# Patient Record
Sex: FEMALE | Race: BLACK OR AFRICAN AMERICAN | NOT HISPANIC OR LATINO | URBAN - METROPOLITAN AREA
[De-identification: names, ages, dates, MRNs, and addresses within clinical notes are randomized per-mention and may not be internally consistent; named-entity substitution may affect disease eponyms.]

---

## 2021-05-09 ENCOUNTER — EMERGENCY (EMERGENCY)
Facility: HOSPITAL | Age: 81
LOS: 1 days | Discharge: HOME | End: 2021-05-09
Attending: EMERGENCY MEDICINE | Admitting: EMERGENCY MEDICINE
Payer: MEDICARE

## 2021-05-09 VITALS
SYSTOLIC BLOOD PRESSURE: 140 MMHG | RESPIRATION RATE: 18 BRPM | OXYGEN SATURATION: 96 % | TEMPERATURE: 98 F | DIASTOLIC BLOOD PRESSURE: 75 MMHG | HEART RATE: 70 BPM

## 2021-05-09 VITALS
HEART RATE: 71 BPM | SYSTOLIC BLOOD PRESSURE: 145 MMHG | TEMPERATURE: 99 F | OXYGEN SATURATION: 96 % | DIASTOLIC BLOOD PRESSURE: 73 MMHG | RESPIRATION RATE: 17 BRPM | WEIGHT: 138.89 LBS | HEIGHT: 59 IN

## 2021-05-09 DIAGNOSIS — Z87.891 PERSONAL HISTORY OF NICOTINE DEPENDENCE: ICD-10-CM

## 2021-05-09 DIAGNOSIS — R42 DIZZINESS AND GIDDINESS: ICD-10-CM

## 2021-05-09 DIAGNOSIS — R55 SYNCOPE AND COLLAPSE: ICD-10-CM

## 2021-05-09 DIAGNOSIS — E03.9 HYPOTHYROIDISM, UNSPECIFIED: ICD-10-CM

## 2021-05-09 DIAGNOSIS — Z20.822 CONTACT WITH AND (SUSPECTED) EXPOSURE TO COVID-19: ICD-10-CM

## 2021-05-09 PROCEDURE — 36415 COLL VENOUS BLD VENIPUNCTURE: CPT

## 2021-05-09 PROCEDURE — 71045 X-RAY EXAM CHEST 1 VIEW: CPT

## 2021-05-09 PROCEDURE — U0005: CPT

## 2021-05-09 PROCEDURE — 84484 ASSAY OF TROPONIN QUANT: CPT

## 2021-05-09 PROCEDURE — 99283 EMERGENCY DEPT VISIT LOW MDM: CPT | Mod: 25

## 2021-05-09 PROCEDURE — 93005 ELECTROCARDIOGRAM TRACING: CPT

## 2021-05-09 PROCEDURE — 85025 COMPLETE CBC W/AUTO DIFF WBC: CPT

## 2021-05-09 PROCEDURE — 83735 ASSAY OF MAGNESIUM: CPT

## 2021-05-09 PROCEDURE — 81003 URINALYSIS AUTO W/O SCOPE: CPT

## 2021-05-09 PROCEDURE — 85610 PROTHROMBIN TIME: CPT

## 2021-05-09 PROCEDURE — U0003: CPT

## 2021-05-09 PROCEDURE — 80053 COMPREHEN METABOLIC PANEL: CPT

## 2021-05-09 PROCEDURE — 71045 X-RAY EXAM CHEST 1 VIEW: CPT | Mod: 26

## 2021-05-09 PROCEDURE — 85730 THROMBOPLASTIN TIME PARTIAL: CPT

## 2021-05-09 PROCEDURE — 99285 EMERGENCY DEPT VISIT HI MDM: CPT

## 2021-05-09 PROCEDURE — 87086 URINE CULTURE/COLONY COUNT: CPT

## 2021-05-09 NOTE — ED PROVIDER NOTE - PATIENT PORTAL LINK FT
You can access the FollowMyHealth Patient Portal offered by [facility] by registering at the following website: [email or web address]. By joining [employer]’s FollowMyHealth portal, you will also be able to view your health information using other applications (apps) compatible with our system.

## 2021-05-09 NOTE — ED PROVIDER NOTE - OBJECTIVE STATEMENT
80F former smoker (5py), htn, hypothyroidism, completed covid19 vaccination (2/2021), c/o witnessed near-syncope episode just pta. per pt/niece, pt had just eaten a large meal and got up quickly w/ a strong urge to defecate w/ subsequent ligtheadedness/flushing feeling herself sink to floor while being guided down by niece. no loc, no head/neck injury, no n/v, no postical, no seizure like activity. at baseline just prior. no sx complaint currently, and feels back to baseline. no fever/chills, no ha, no neck pain/stiffness, no photophobia/vision changes, no uri/cough, no cp/sob, no abd pain/n/v, no diarrhea, no hematochezia/melena, no change in appetite/po intake, no dysuria, no rash, no recent travel, no sick contacts, no prior covid, no trauma, no etoh-dpt/ivdu.     highly functional at baseline, a+ox3, +ADLs, ambulates unassisted, lives at home. 80F former smoker (5py), htn, hypothyroidism, completed covid19 vaccination (2/2021), c/o witnessed near-syncope episode just pta. per pt/niece, pt had just eaten a large meal and got up quickly w/ a strong urge to defecate w/ subsequent ligtheadedness/flushing feeling herself sink to floor while being guided down by niece. no loc, no head/neck injury, no n/v, no postical, no seizure like activity. at baseline just prior. no sx complaint currently, and feels back to baseline. no fever/chills, no ha, no neck pain/stiffness, no photophobia/vision changes, no uri/cough, no cp/sob, no abd pain/n/v, no diarrhea, no hematochezia/melena, no change in appetite/po intake, no dysuria, no rash, no recent travel, no sick contacts, no prior covid, no trauma, no etoh-dpt/ivdu.     highly functional at baseline, a+ox3, +ADLs, ambulates w/ cane, lives at home. 80F former smoker (5py), htn, hypothyroidism, completed covid19 vaccination (2/2021), c/o witnessed near-syncope episode just pta. per pt/niece, pt had just eaten a large meal and got up quickly w/ a strong urge to defecate w/ subsequent ligtheadedness/flushing feeling herself sink to floor while being guided down by niece. no loc, no head/neck injury, no n/v, no postictal, no seizure like activity. at baseline just prior. no sx complaint currently, and feels back to baseline. no fever/chills, no ha, no neck pain/stiffness, no photophobia/vision changes, no uri/cough, no cp/sob, no abd pain/n/v, no diarrhea, no hematochezia/melena, no change in appetite/po intake, no dysuria, no rash, no recent travel, no sick contacts, no prior covid, no trauma, no etoh-dpt/ivdu.     highly functional at baseline, a+ox3, +ADLs, ambulates w/ cane, lives at home.

## 2021-05-09 NOTE — ED PROVIDER NOTE - CLINICAL SUMMARY MEDICAL DECISION MAKING FREE TEXT BOX
avss. nontoxic. NAD. no systemic sx. atraumatic. neg orthostatics. no acute focal neuro deficits. no red flags. no leukocytosis vs significant anemia vs electrolyte abnl. trop wnl x2. ekg w/o significant st/t changes. cxr w/o acute focal consol vs ptx vs pulm edema vs widened mediastinum. no recurrent sx. tolerating po. ambulating at baseline to bathroom. no indication for ct imaging or inpatient hospitalization at this time. will dc w/ outpatient pcp fu, strict return precautions. pt/family agrees w/ plan. questions answered.

## 2021-05-09 NOTE — ED PROVIDER NOTE - PHYSICAL EXAMINATION
CONST: nontoxic NAD speaking in full sentences  HEAD: atraumatic  EYES: conjunctivae clear, PERRL, EOMI  ENT: mmm  NECK: supple/FROM, nttp, no jvd  CARD: rrr no murmurs  CHEST: ctab no r/r/w, no stridor/retractions/tripoding  ABD: soft, nd, nttp, no rebound/guarding  EXT: FROM, symmetric distal pulses intact  SKIN: warm, dry, no rash, no pedal edema/ttp/rash, cap refill <2sec  NEURO: a+ox3, 5/5 strength x4, gross sensation intact x4, baseline gait

## 2021-05-09 NOTE — ED ADULT NURSE NOTE - OBJECTIVE STATEMENT
79 y/o female received into the ED, axox3, stating that she "passed out for a few seconds on my way to the bathroom."  Pt. states that she had finished dinner when she had a sudden urge to go to the bathroom and felt very light headed when she began to walk to the bathroom.  Adult niece at the bedside states that patient became pale suddenly and fell into her the arms of her family members.  Niece reports that patient seemed to loose consciousness for about 2-3 seconds.  Pt. states that at this time she does not feel any dizziness.

## 2021-05-09 NOTE — ED PROVIDER NOTE - NSFOLLOWUPINSTRUCTIONS_ED_ALL_ED_FT
PLEASE FOLLOW-UP WITH YOUR PCP IN 1-2 DAYS.    ANY XRAY IMAGING RESULTS GIVEN IN THE EMERGENCY DEPARTMENT ARE PRELIMINARY UNTIL FORMALLY READ BY A RADIOLOGIST. YOU WILL BE CONTACTED IF THERE ARE ANY SIGNIFICANT CHANGES IN THE FINAL READ.    PLEASE RETURN TO THE EMERGENCY DEPARTMENT IF SEVERE HEADACHE, RECURRENT/PERSISTENT DIZZINESS, FAINTING, FEVER, CHEST PAIN, SHORTNESS OF BREATH, ABDOMINAL PAIN, VOMITING, OTHER CONCERNING SYMPTOMS.                          LIGHTHEADEDNESS - Ambulatory Care           Lightheadedness    AMBULATORY CARE:    Lightheadedness is the feeling that you may faint, but you do not. Your heartbeat may be fast or feel like it flutters. Lightheadedness may occur when you take certain medicines, such as medicine to lower your blood pressure. Dehydration, low sodium, low blood sugar, an abnormal heart rhythm, and anxiety are other common causes.     Seek care immediately if:   •You have sudden chest pain.       •You have trouble breathing or shortness of breath.       •You have vision changes, are sweating, and have nausea while you are sitting or lying down.       •You feel flushed and your heart is fluttering.      •You pass out.       Contact your healthcare provider if:   •You feel lightheaded often.       •Your heart beats faster or slower than usual.       •You have questions or concerns about your condition or care.      Manage your symptoms: Talk with your healthcare provider about these and other ways to manage your symptoms:  •Lie down when you feel lightheaded, your throat gets tight, or your vision changes. Raise your legs above the level of your heart.      •Stand up slowly. Sit on the side of the bed or couch for a few minutes before you stand up.       •Take slow, deep breaths when you feel lightheaded. This can help decrease the feeling that you might faint.       •Ask if you need to avoid hot baths and saunas. These may make your symptoms worse.       Watch for signs of low blood sugar: These include hunger, nervousness, sweating, and fast or fluttery heartbeats. Talk with your healthcare provider about ways to keep your blood sugar level steady.    Check your blood pressure often: You should do this especially if you take medicine to lower your blood pressure. Check your blood pressure when you are lying down and when you are standing. Ask how often to check during the day. Keep a record of your blood pressure numbers. Your healthcare provider may use the record to help plan your treatment.    Keep a record of your lightheadedness episodes: Include your symptoms and your activity before and after the episode. The record can help your healthcare provider find the cause of your lightheadedness and help you manage episodes.    Follow up with your healthcare provider as directed: You may need more tests to help find the cause of your lightheadedness. The tests will help healthcare providers plan the best treatment for you. Write down your questions so you remember to ask them during your visits.       © Copyright [employer] 2021           back to top                          © Copyright [employer] 2021

## 2021-05-09 NOTE — ED ADULT TRIAGE NOTE - CHIEF COMPLAINT QUOTE
BIBA from home accompanied by daughter reporting syncopal episode. per daughter pt "got up to go to the bathroom and said she felt lightheaded and we caught her." no trauma. denies dizziness at this time, denies cp.  by EMS.

## 2021-05-09 NOTE — ED ADULT NURSE NOTE - NS ED NURSE IV DC DT
[facility] report submitted. [facility]   09-May-2021 20:33

## 2023-11-07 NOTE — ED PROVIDER NOTE - NSTIMEPROVIDERCAREINITIATE_GEN_ER
----- Message from Kirsten Fuentes sent at 11/7/2023 11:19 AM CST -----  Regarding: [facility] appt  Contact: @ [phone]  Pt is calling to reschedule appointment on 12/06 to another date ...no available dates Pleaes call and adv @ [phone]    
Pt was rescheduled for Mohs sx from 12/6/23 to 12/18/23 for 7:30, BCC right nasal bridge. Pt verbally confirmed new appt date and time.  
09-May-2021 18:04